# Patient Record
Sex: FEMALE | Race: WHITE
[De-identification: names, ages, dates, MRNs, and addresses within clinical notes are randomized per-mention and may not be internally consistent; named-entity substitution may affect disease eponyms.]

---

## 2018-05-27 ENCOUNTER — HOSPITAL ENCOUNTER (EMERGENCY)
Dept: HOSPITAL 84 - D.ER | Age: 58
Discharge: HOME | End: 2018-05-27
Payer: COMMERCIAL
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
## 2018-05-27 DIAGNOSIS — S79.912A: Primary | ICD-10-CM

## 2018-06-28 ENCOUNTER — HOSPITAL ENCOUNTER (OUTPATIENT)
Dept: HOSPITAL 84 - D.MRI | Age: 58
Discharge: HOME | End: 2018-06-28
Attending: NURSE PRACTITIONER
Payer: COMMERCIAL

## 2018-06-28 DIAGNOSIS — M25.552: Primary | ICD-10-CM

## 2019-04-19 ENCOUNTER — HOSPITAL ENCOUNTER (OUTPATIENT)
Dept: HOSPITAL 84 - D.MAMMO | Age: 59
Discharge: HOME | End: 2019-04-19
Attending: FAMILY MEDICINE
Payer: COMMERCIAL

## 2019-04-19 DIAGNOSIS — Z12.31: Primary | ICD-10-CM

## 2020-08-14 ENCOUNTER — HOSPITAL ENCOUNTER (OUTPATIENT)
Dept: HOSPITAL 84 - D.HCCARDIO | Age: 60
Discharge: HOME | End: 2020-08-14
Attending: INTERNAL MEDICINE
Payer: MEDICAID

## 2020-08-14 DIAGNOSIS — I20.9: ICD-10-CM

## 2020-08-14 DIAGNOSIS — I10: Primary | ICD-10-CM

## 2020-09-17 ENCOUNTER — HOSPITAL ENCOUNTER (OUTPATIENT)
Dept: HOSPITAL 84 - D.CATH | Age: 60
Discharge: HOME | End: 2020-09-17
Attending: INTERNAL MEDICINE
Payer: MEDICAID

## 2020-09-17 VITALS — BODY MASS INDEX: 40.13 KG/M2 | HEIGHT: 66 IN | WEIGHT: 249.72 LBS

## 2020-09-17 VITALS — DIASTOLIC BLOOD PRESSURE: 60 MMHG | SYSTOLIC BLOOD PRESSURE: 125 MMHG

## 2020-09-17 DIAGNOSIS — I25.10: Primary | ICD-10-CM

## 2020-09-17 DIAGNOSIS — R94.39: ICD-10-CM

## 2020-09-17 DIAGNOSIS — R53.83: ICD-10-CM

## 2020-09-17 DIAGNOSIS — I48.91: ICD-10-CM

## 2020-09-17 DIAGNOSIS — E78.5: ICD-10-CM

## 2020-09-17 DIAGNOSIS — I10: ICD-10-CM

## 2020-09-17 DIAGNOSIS — R06.00: ICD-10-CM

## 2020-09-17 LAB
ALT SERPL-CCNC: 24 U/L (ref 10–68)
ANION GAP SERPL CALC-SCNC: 13 MMOL/L (ref 8–16)
BASOPHILS NFR BLD AUTO: 1 % (ref 0–2)
BUN SERPL-MCNC: 21 MG/DL (ref 7–18)
CALCIUM SERPL-MCNC: 9.5 MG/DL (ref 8.5–10.1)
CHLORIDE SERPL-SCNC: 105 MMOL/L (ref 98–107)
CHOLEST/HDLC SERPL: 4.7 RATIO (ref 2.3–4.1)
CO2 SERPL-SCNC: 24.7 MMOL/L (ref 21–32)
CREAT SERPL-MCNC: 0.5 MG/DL (ref 0.6–1.3)
EOSINOPHIL NFR BLD: 4.1 % (ref 0–7)
ERYTHROCYTE [DISTWIDTH] IN BLOOD BY AUTOMATED COUNT: 13.4 % (ref 11.5–14.5)
GLUCOSE SERPL-MCNC: 138 MG/DL (ref 74–106)
HCT VFR BLD CALC: 44 % (ref 36–48)
HDLC SERPL-MCNC: 42 MG/DL (ref 32–96)
HGB BLD-MCNC: 15.3 G/DL (ref 12–16)
IMM GRANULOCYTES NFR BLD: 0 % (ref 0–5)
LDL-HDL RATIO: 3.2 RATIO (ref 1.5–3.5)
LDLC SERPL-MCNC: 136 MG/DL (ref 0–100)
LYMPHOCYTES NFR BLD AUTO: 27.6 % (ref 15–50)
MCH RBC QN AUTO: 29.9 PG (ref 26–34)
MCHC RBC AUTO-ENTMCNC: 34.8 G/DL (ref 31–37)
MCV RBC: 86.1 FL (ref 80–100)
MONOCYTES NFR BLD: 10.5 % (ref 2–11)
NEUTROPHILS NFR BLD AUTO: 56.8 % (ref 40–80)
OSMOLALITY SERPL CALC.SUM OF ELEC: 282 MOSM/KG (ref 275–300)
PLATELET # BLD: 284 10X3/UL (ref 130–400)
PMV BLD AUTO: 10 FL (ref 7.4–10.4)
POTASSIUM SERPL-SCNC: 3.7 MMOL/L (ref 3.5–5.1)
RBC # BLD AUTO: 5.11 10X6/UL (ref 4–5.4)
SODIUM SERPL-SCNC: 139 MMOL/L (ref 136–145)
TRIGL SERPL-MCNC: 106 MG/DL (ref 30–200)
WBC # BLD AUTO: 6.2 10X3/UL (ref 4.8–10.8)

## 2020-09-17 PROCEDURE — 93458 L HRT ARTERY/VENTRICLE ANGIO: CPT

## 2020-09-17 NOTE — HEMODYNAMI
PATIENT:JOSE GUADALUPE WAGONER                                  MEDICAL RECORD: [id number]
: 58                                            LOCATION:[facility]          
ACCT# [id number]                                       ADMISSION DATE: 20
 
 
 Generatedon:202015:01
Patient name: JOSE GUADALUPE WAGONER Patient #: [id number] Visit #: [id number] SSN: 5
[id number] :
1960 Date of study: 2020
Page: Of
Hemodynamic Procedure Report
****************************
Patient Data
Patient Demographics
Procedure consent was obtained
First Name: JOSE GUADALUPE           Gender: Female
Last Name: CIARAN        : 1960
Patient #: [id number]       Age: 60 year(s)
Visit #: [id number]       Race: 
SSN: [id number]
Accession #:
[id number]
Additional ID: 
Contact details
Address: [place] [address]        Phone: [phone]
State: [place]
City: [place]
Zip code: [address]
Past Medical History
Performed procedures and imaging results
Date      Procedure       Procedure Results      Comments
2020 Stress testing  Positive->Intermediate
with SPECT MPI  risk
Allergies: No known allergies
Admission
Admission Data
Admission Date: 2020   Admission Time: 11:38
Arrival Date: 2020     Arrival Time: 0:00
Admit Source: Other         Insurance Payor: Private
health insurance
[facility] #: [id number]
Height (in.): 66            BSA: 2.2 (m2)
Height (cm.): 167.64        BMI: 40.31 (kg/m2)
Weight (lbs.): 249.72
Weight (kg.): 113.27
Lab Results
Lab Result Date: 2020  Lab Result Time: 0:00
Biochemistry
Name         Units    Result                Min      Max
BUN          mg/dl    21       --(----)-*   7        18
Creatinine   mg/dl    0.5      -*(----)--   0.6      1.3
eGFR         ml/min   90       --(*---)--   90       120
NONAFRICAN
CBC
Name         Units    Result                Min      Max
Hematocrit   %        44       --(*---)--   42       54
Hemoglobin   g/dl     15.3     --(-*--)--   13.5     17.5
Procedure
Procedure Types
 
Cath Procedure
Diagnostic Procedure
[facility] w/Coronaries
Sedation Charges
Moderate Sedation up to 30 minutes
PCI Procedure
Coronary Stent
Coronary Stent Initial
Hemochron ACT Test
Procedure Description
Procedure Date
Procedure Date: 2020
Procedure Start Time: 14:35
Procedure End Time: 15:00
Procedure Staff
Name                            Function
Bay Bryant MD              Performing Physician
Lanette Chowdhury RT                Monitor
Yola Mckinney RT                    Scrub
Yamileth Sutton RN                  Nurse
Procedure Data
Cath Procedure
Fluoroscopy
Diagnostic fluoroscopy      Total fluoroscopy Time: 2.9
time: 2.9 min               min
Diagnostic fluoroscopy      Total fluoroscopy dose: 924
dose: 924 mGy               mGy
Contrast Material
Contrast Material Type                       Amount (ml)
Isovue 370                                   116
Entry Location
Entry     Primary  Successful  Side  Size  Upsize Upsize Entry    Closure Succes
sful  Closure
Location                             (Fr)  1 (Fr) 2 (Fr) Remarks  Device        
      Remarks
Femoral                        Right 5 Fr  6 Fr                   Exoseal
artery                                     Short
Estimated blood loss: 10 ml
Diagnostic catheters
Device Type               Used For           End Catheter
Placement
MULTIPACK JL 4.0 5Fr      Procedure
catheter
MULTIPACK 3DRC 5Fr        Procedure
catheter
MULTIPACK Pigtail 5 Fr    Procedure
catheter
Procedure Complications
No complications
Procedure Medications
Medication           Administration Route Dosage
Oxygen               etCO2 Nasal cannula  2 l/min
Lidocaine 2%         added to field       20
Heparin Flush Bag    added to field       2 bags
(1000units/500ml NS)
0.9% NaCl            I.V.                 100 ml/hr
Versed               I.V.                 2 mg
Fentanyl             I.V.                 50 mcg
Versed               I.V.                 2 mg
 
Fentanyl             I.V.                 50 mcg
Heparin Bolus        I.V.                 5000 units
Integrilin (Bolus    I.V.                 10.2 ml
2mg/ml)
Plavix               P.O.                 600 mg
Hemodynamics
Rest
BSA: 2.2 (m2) HGB: 15.3 (g/dl) O2 Consumption: Estimated: 202.09 (ml/min) O2 Con
sumption indexed:
Estimated:91.86 (ml/min/m) Heart Rate: 63 (bpm)
Pressure Samples
Time     Site     Value (mmHg) Purpose      Heart      Use
Rate(bpm)
14:45    LV       115/16,22    Snapshot     68
14:45    AO       94/48(73)    Pullback     68
14:45    LV       114/7,19     Pullback     68
Gradients
Valve  Time  Site 1   Site 2    Mean    SEP/DFP    Peak To Heart  Use
(mmHg)  (sec/min)  Peak    Rate
(mmHg)  (bpm)
Aortic 14:45 LV       AO        20      24         20      68
114/7,19 94/48(73)
Calculations
Valve    P-P      Mean      Valve     Index  Valve    Source
Name              Gradient  Area             Flow
(cm2)
Aortic   20       20
20       20
Snapshots
Pre Cath      Intra         NCS           Post Cath
Vital Signs
Time     Heart  Resp   SPO2 etCO2   NIBP (mmHg) Rhythm  Pain    Sedation
Rate   (ipm)  (%)  (mmHg)                      Status  Level
(bpm)
14:10:46 61     11     96   0       120/65(82)  NSR     0 (11)  10(A)
, No
pain
14:15:15 62     14     94   38.9    115/64(91)  NSR     0 (11)  10(A)
, No
pain
14:19:39 63     13     94   42.6    120/67(97)  NSR     0 (11)  10(A)
, No
pain
14:24:07 65     13     96   41.9    120/65(95)  NSR     0 (11)  10(A)
, No
pain
14:28:36 64     13     96   24.7    113/64(88)  NSR     0 (11)  10(A)
, No
pain
14:33:02 58     12     96   29.2    106/63(88)  NSR     0 (11)  10(A)
, No
pain
14:37:24 59     12     96   30.7    111/62(92)  NSR     0 (11)  10(A)
, No
pain
14:41:46 63     12     96   17.2    126/72(98)  NSR     0 (11)  10(A)
, No
pain
14:46:17 65     15     98   17.2    123/70(83)  NSR     0 (11)  10(A)
, No
 
pain
14:50:47 67     13     97   31.4    113/64(91)  NSR     0 (11)  9(A)
, No
pain
14:55:46 65     15     98   39.6    Measuring   NSR     0 (11)  9(A)
, No
pain
14:56:37 67     14     98   28.4    121/73(105) NSR     0 (11)  10(A)
, No
pain
15:01:03 65     8      99   38.9    135/71(94)  NSR     0 (11)  10(A)
, No
pain
Medications
Time     Medication       Route   Dose  Verified Delivered Reason          Notes
    Effectiveness
by       by
14:14:02 Oxygen           etCO2   2     Bay Carson    used for
Nasal   l/min [facility] Sutton RN   procedure
cannula       MD
14:26:49 Lidocaine 2%     added   20ml  Bay Hermosillo   for local
to      vial  [facility]   anesthetic
field         MD       MD
14:28:56 Heparin Flush    added   2     Bay Hermosillo   used for
Bag              to      bags  [facility]   procedure
(1000units/500ml field         MD       MD
NS)
14:29:05 0.9% NaCl        I.V.    100   Bay Crason    Per physician
ml/hr [facility] Sutton RN, MD
14:29:11 Versed           I.V.    2 mg  Bay Carson    for sedation
[facility] RN, MD
14:29:17 Fentanyl         I.V.    50    Bay Carson    for sedation
mcg   [facility] Sutton RN, MD
14:47:05 Versed           I.V.    2 mg  Bay Carson    for sedation
[facility] Sutton RN, MD
14:47:08 Fentanyl         I.V.    50    Bay Carson    for sedation
mcg   [facility] Sutton RN, MD
14:47:18 Heparin Bolus    I.V.    5000  Bay Carson    for             verif
ied
units [facility] Paul Sutton RN   anticoagulation with dr MD roldan
14:49:57 Integrilin       I.V.    10.2  Bay Carson    for             waste
d
(Bolus 2mg/ml)           ml    [facility] Sutton RN   antiplatelet    9.8 ml
MD                 therapy         of vial
14:59:51 Plavix           P.O.    600   Bay Carson    for
mg    Kai  Bell RN   antiplatelet
MD                 therapy
Procedure Log
Time     Note
13:45:19 Diagnostic Cath Status : Elective
13:46:10 Procedure Status Elective Heart Cath (OP).
13:46:13 Yamileth Sutton RN sent for patient. Start room use.
13:46:13 Time tracking: Regular hours (M-F 7:00 - 5:00)
13:46:17 Plan of Care:Hemodynamics will remain stable., Cardiac
 
rhythm will remain stable., Comfort level will be
maintained., Respiratory function will remain
adequate., Patient/ family verbilizes understanding of
procedure., Procedure tolerated without complication.,
Recovers from procedure without complications..
13:59:48 Admit Source: Other
13:59:56 Patient received from Pre/Post Procedure Room to CCL 1
Alert and oriented. Tansferred to table in Supine
position.
13:59:59 Signed procedure consent form obtained from patient.
13:59:59 Warm blankets applied, and abraham hugger turned on for
patient comfort.
14:00:00 Correct patient and procedure confirmed by team.
14:00:01 ECG and BP/O2 sat monitors applied to patient.
14:00:11 H&P Date Dictated: 2020 Within 30 days and on
chart..
14:00:12 Pre-procedure instructions explained to patient.
14:00:13 Pre-op teaching completed and patient verbalized
understanding.
14:00:17 Family unavailable.
14:00:19 Patient NPO since Midnight.
14:00:58 Lab results completed and on chart.
14:: Lab Result : BUN 21 mg/dl
14:: Lab Result : Creatinine 0.5 mg/dl
14:: Lab Result : Hemoglobin 15.3 g/dl
14:: Lab Result : eGFR NONAFRICAN 90 ml/min
14:: Lab Result : Hematocrit 44 %
14:09:26 Vital chart was started
14:09:27 Full Disclosure recording started
14:09:33 Patient allergic to No known allergies
14:09:38 Is the patient allergic to Iodine/contrast media? No.
14:09:40 Was the patient premedicated? N/A
14:09:43 Is patient on blood thinner?Yes
14:09:46 **ACC** The patient was administered the following
blood thiners within the last 24 hours: Eliquis
14:09:50 Patient diabetic? No.
14:10:03 ----Pre-sedation anethsthesia assessment.----
14:10:06 Previous problem with sedation/anesthesia? No ?
14:10:07 Snore? Yes
14:10:10 Sleep apnea? Unknown
14:10:15 Deviated septum? No
14:10:16 Opens mouth fully? Yes
14:10:17 Sticks out tongue? Yes
14:10:19 Airway obstruction? No ?
14:10:21 Dentures? No ?
14:10:24 Pre procedure: right dorsailis pedis pulse 2+ Normal;
easily identifiable; not easily obliterated
14:10:28 Modified Kush's test Ulnar < 7 seconds
14:10:31 Patient pain scale 0/10 ?.
14:10:35 IV patent on arrival in left antecubital with 0.9%
NaCl at O.
14:10:41 Stress Test: yes; abnormal ANTERIOR
14:10:45 Right Radial & Right Groin area was prepped with
chlora-prep and draped in sterile fashion
14:10:46 Alarms reviewed by OTTONIEL NEWMAN.
14:10:47 Sharps counted by scrub and verified by JERICA.
14:11:37 Arrival Date: 2020 12:00:00 AM
14:11:46 Patient Height : 66 inches
14:11:50 Patient Weight : 249.72 lbs
14:12:02 Insurance Payor : Private health insurance
 
14:14:02 Oxygen 2 l/min etCO2 Nasal cannula was administered by
Yamileth Sutton RN; used for procedure; Verbal order read
back and verified.
14:14:40 Baseline sample Acquired.
14:14:52 Rhythm: atrial fibrillation
14:15:39 1) 90+ Normal kidney functon but urine findings or
structural abnormalities or genetic trait point to
kidney disease.
14:15:41 Maximum allowable contrast dose (3.7 X eGFR X 0.75)250
ml.
14:19:28 Fire Safety Assessment: A--An alcohol-based skin
anteseptic being used preoperatively., C--Open oxygen
or nitrous oxide is being used., D--An ESU, laser, or
fiber-optic light is being used.
14:19:31 Physical assessment completed. ASA score P 2 - A
patient with mild systemic disease as per Bay Bryant MD.
14:26:49 Lidocaine 2% 20ml vial added to field was administered
by Bay Bryant MD; for local anesthetic; Verbal
order read back and verified.
14:28:49 --------ALL STOP TIME OUT------
14:28:55 Right Radial & Right Groin site verified by team.
14:28:56 Heparin Flush Bag (1000units/500ml NS) 2 bags added to
field was administered by Bay Bryant MD; used for
procedure; Verbal order read back and verified.
14:29:01 Final Timeout: patient, procedure, and site verified
with staff and physician. All members of the team are
in agreement.
14:29:02 Sedation plan: IV Moderate Sedation Medication:Versed,
Fentanyl
14:29:05 0.9% NaCl 100 ml/hr I.V. was administered by Yamileth Sutton RN; Per physician; Verbal order read back and
verified.
14:29:11 Versed 2 mg I.V. was administered by Yamileth Sutton RN;
for sedation; Verbal order read back and verified.
14:29:17 Fentanyl 50 mcg I.V. was administered by Yamileth Sutton
RN; for sedation; Verbal order read back and verified.
14:30:05 Use device set Radial Dx or PCI
14:30:07 ACIST Syringe (81293) opened to sterile field.
14:30:07 Medline Cath Pack ([id number]) opened to sterile field.
14:30:09 Bag Decanter (2002S) opened to sterile field.
14:30:09 ACIST Hand Control (31850) opened to sterile field.
14:30:10 ACIST Manifold (86839) opened to sterile field.
14:30:13 MBrace Wrist Support ([id number]) opened to sterile
field.
14:30:14 EMERALD Guide Wire (502-265) opened to sterile field.
14:30:15 SHEATH 6FR RAIN (1358609) opened to sterile field.
14:34:21 Procedure started.
14:35:04 Local anesthetic to right radial artery with Lidocaine
2% by Bay Bryant MD.**INITIAL ACCESS ONLY**
14:39:13 UNABLE TO GAIN ACCESS RADIAL PROCEEDING TO GROIN.
14:39:20 Use device set Femoral Dx
14:39:29 DIAGNOSTIC Multipack 5Fr catheter set (VL8735) opened
to sterile field.
14:39:34 SHEATH 5FR [place] (UYW695) opened to sterile field.
14:39:45 Local anesthetic to right femoral artery with
Lidocaine 2% by Bay Bryant MD.**ADDITIONAL
ACCESS**
14:40:46 A 5 Fr sheath was inserted into the Right Femoral
artery
 
14:41:10 A MULTIPACK JL 4.0 5Fr catheter was advanced over the
wire and used for Procedure.
14:41:15 LCA angiography performed.
14:41:18 Injector settings: Ml/sec: 3, Volume: 6,
14:42:28 Catheter removed.
14:42:57 A MULTIPACK 3DRC 5Fr catheter was advanced over the
wire and used for Procedure.
14:43:01 RCA angiography performed.
14:43:04 Injector settings: Ml/sec: 3, Volume: 6,
14:43:37 **ACC**Dominant side:Co-Dominant
14:43:44 Catheter removed.
14:43:48 Use device set KAI PCI
14:43:58 A MULTIPACK Pigtail 5 Fr catheter was advanced over
the wire and used for Procedure.
14:44:06 LV gram done using BETANCUR
14:44:10 Injector settings: Ml/sec: 5, Volume: 15,
14:45:17 LV hemodynamics recorded.
14:45:22 EF : 55 %
14:45:28 Catheter removed.
14:45:54 SHEATH 6FR [place] (ZOW383) opened to sterile field.
14:45:57 INFLATOR Merit BasixCompak (SW4746) opened to sterile
field.
14:46:19 BMW 300cm Universal 2 J wire ([id number]) opened to
sterile field.
14:46:34 Proceeding to intervention.
14:46:51 Sheath upsized to a 6 Fr Short.
14:47:05 Versed 2 mg I.V. was administered by Yamileth Sutton RN;
for sedation; Verbal order read back and verified.
14:47:08 Fentanyl 50 mcg I.V. was administered by Yamileth Sutton
RN; for sedation; Verbal order read back and verified.
14:47:18 Heparin Bolus 5000 units I.V. was administered by
Yamileth Sutton RN; for anticoagulation; verified with dr roldan Verbal order read back and verified.
14:48:07 GUIDE 6FR XBLAD 3.5 catheter ([id number]) opened to
sterile field.
14:48:14 6 Fr XBLAD 3.5 guide catheter was inserted over the
wire
14:48:27 Pre PCI Site: Native OM1 has 80% stenosis.
14:48:34  wire advanced.
14:48:40 Wire advanced across lesion.
14:49:57 Integrilin (Bolus 2mg/ml) 10.2 ml I.V. was
administered by Yamileth Sutton RN; for antiplatelet
therapy; wasted 9.8 ml of vial Verbal order read back
and verified.
14:51:47 Place stent Inflation Number: 1 A AMERICA RX 2.5 x 12
stent ([id number]) was prepped and advanced across
the 1st Ob Chasity 80. The stent was deployed at 14 DUNIA
for 0:00 (min:sec) .
14:52:46 Inflation number: 2 The stent balloon was then
re-inflated across the 1st Ob Chasity to 8 DUNIA for 0:00
(min:sec) .
14:53:00 EXOSEAL 6Fr () opened to sterile field.
14:56:01 Stent catheter was removed intact over wire.
14:56:02 Wire removed.
14:56:02 Guide catheter removed.
14:56:14 Sheath removed intact; hemostasis achieved with
Exoseal to the Right Femoral artery.
14:56:17 Procedure ended.(Physican Out)
14:56:48 Fluoroscopy time 02.90 minutes.
14:56:52 Fluoroscopy dose: 924 mGy
 
14:56:52 Flurop Dose total: 924
14:56:59 Dose Area Product 10584 mGy/cm.
14:57:04 Contrast amount:Isovue 370 116ml.
14:57:06 Maximum allowable dose exceeded? Yes.
14:57:07 Sharps counted by scrub and verified by R.N.
14:57:12 Post-op/insertion site Right Femoral artery dressed
using a 4 x 4 and Tegaderm.
14:57:19 Post right femoral artery:stable, soft, clean and dry
14:57:29 Post Procedure Pulses reassessed and unchanged
14:57:32 Post procedure: right dorsailis pedis pulse 2+ Normal;
easily identifiable; not easily obliterated.
14:57:34 Post-procedure physical assessment completed. ASA
score P 2 - A patient with mild systemic disease as
per Bay Bryant MD.
14:57:38 Post procedure rhythm: unchanged.
14:57:41 Estimated blood loss: 10 ml
14:57:42 Post procedure instruction explained to
patient.Patient verbalizes understanding.
14:57:43 Patient needs reinforcement of post procedure
teaching.
14:58:11 Procedure type changed to Cath procedure, Diagnostic
procedure, C, [facility] w/Coronaries, Sedation Charges,
Moderate Sedation up to 30 minutes, PCI procedure,
Coronary Stent, Coronary Stent Initial, Hemochron ACT
Test
14:58:58 Procedure and supply charges have been captured,
reviewed, submitted and are correct.
14:59:03 Procedure Complication : No complications
14:59:08 [facility] Findings: MVD- PCI performed (see procedure note)
14:59:11 Operative report dictated upon procedure completion.
14:59:13 See physician's report for complete and final results.
14:59:16 Report given to Pre/Post Procedure Room.
14:59:21 Patient transfered to Pre/Post Procedure Room with
Stretcher.
14:59:29 **ACC-PCI Only** Patient was given prescriptions, or
instructed by Bay Bryant MD to start/continue the
following medications upon discharge: Plavix
14:59:51 Plavix 600 mg P.O. was administered by Yamileth Sutton RN;
for antiplatelet therapy; Verbal order read back and
verified.
15:00:01 ACT drawn and resulted at 217 seconds. (normal
therapeutic range 180-240 seconds).
15:00:36 Vital chart was stopped
15:00:40 Procedure ended.
15:00:40 Full Disclosure recording stopped
15:00:48 End room use (Document Last)
15:00:59 End room use (Document Last)
Intervention Summary
Intervention Notes
Time     ActionType  Lesion and  Equipment Used Action#  Pressure  Duration
Attributes
14:51:47 Place stent 1st Ob Chasity AMERICA RX 2.5 x  1        14        00:00
12 stent
([id number])
14:52:46 Reinflate   1st Ob Chasity AMERICA RX 2.5 x  2        8         00:00
stent                   12 stent
balloon                 ([id number])
Device Usage
Item Name      Manufacture  Quantity  Catalog       [facility] Part     Current Mi
nimal Lot# /
 
Number        Charge   Number   Stock   Stock   Serial#
Code
ACIST Syringe  Acist        1         04132         448675   550920   439855  20
(90326)        Medical
Systems Inc
Medline Cath   Medline      1         [id number]     635579   03747    370908  5
Pack
([id number])
Bag Decanter   Microtek     1         S         904031   58772    554265  5
(S)        Medical Inc.
ACIST Hand     Acist        1         82782         076208   077257   885879  5
Control        Medical
(28984)        Systems Inc
ACIST Manifold Acist        1         54080         120050   413639   964421  5
(50352)        Medical
Systems Inc
MBrace Wrist   Advanced     1         140-0250-00   088029   08749    102170  5
Support        Vascular
([id number])    Dynamics
EMERALD Guide  Cardinal     1         502-455       532894   847311   078529  5
Wire (502-455) Health
SHEATH 6FR     Cardinal     1         8224548       218968   2800123  395501  5
RAIN (3028278) Health
DIAGNOSTIC     Cardinal     1         LC0127        480094   96947    136625  30
Multipack 5Fr  Health
catheter set
(UH6225)
SHEATH 5FR     Terumo       1         MPB751        222960   564979   293837  5
[place]
(ULF265)
MULTIPACK JL   Cardinal     1                                         103589  5
4.0 5Fr        Health
catheter
MULTIPACK 3DRC Cardinal     1                                         855731  5
5Fr catheter   Health
MULTIPACK      Cardinal     1                                         090247  5
Pigtail 5 Fr   Health
catheter
SHEATH 6FR     Terumo       1         [id number]        363049   613280   881810  40
[place]
(ZDJ410)
INFLATOR Merit Merit        1         IY0924        420125   726485   856379  15
[facility]    Medical
(PZ1823)
BMW 300cm      Hollis       1         2072243Q      440428   960232   659806  5
[place]iss 2 J  Vascular
wire
(7068774A)
GUIDE 6FR      Cardinal     1         11975566      331310   983458   243531  10
XBLAD 3.5      Health
catheter
(89600091)
AMERICA RX 2.5 x  Medtronic    1         [id number]  873696   2348881  146590  5 
      [id number]
12 stent
([id number])
EXOSEAL 6Fr    Cardinal     1                  609071   289812   554410  10
()        Health
Signature Audit [place]
Stage           Time        Signature      Unsigned
 
Intra-Procedure 2020   Lanette Chowdhury
3:00:59 PM  RT(R)
Intra-Procedure 2020   Yamileth Sutton RN
3:01:14 PM
Intra-Procedure 2020   Bay Sawant
3:01:35 PM  Paul WHITT
 
 
 
 
 
 
 
 
 
 
 
 
 
 
 
 
 
 
 
 
 
 
 
 
 
 
 
 
 
 
 
 
 
 
 
 
 
 
 
 
 
 
 
 
 
 
 
 
 
[facility]                                          
[address] [place] [address]

## 2020-09-17 NOTE — NUR
PT RECEIVED TO ROOM VIA STRETCHER FOR RECOVERY.  PT AWAKE BUT DROWSY,
DENIES PAIN OR DISCOMFORT AT THIS TIME.  IV HEPLOCKED.  PT PLACED ON
CARDIAC MONITORS AND O2 VIA NC AT 2L.  HR NSR DUKE 60, /80, RR
10, SAT 99.  R GROIN SOFT, DRESSING CDI NO S/S HEMATOMA OR BLEEDING.
LEG PINK AND WARM, PEDAL PULSES PALPALBE. PT INSTRUCTED TO KEEP HEAD
ON PILLOW AND LEG STRAIGHT, SHE VERBALIZED UNDERSTANDING.  CALL LIGHT
IN REACH,  AT BS

## 2020-09-17 NOTE — NUR
PT RESTING COMFORTABLY, DENIES PAIN OR NEEDS.  R GROIN SOFT, DRESSING
REMAINS CDI NO S/S HEMATOMA NOTED.  CALL LIGHT IN REACH,  AT
BEDSIDE.

## 2020-09-17 NOTE — OP
PATIENT NAME:  JOSE GUADALUPE WAGONER                           MEDICAL RECORD: [id number]
:58                                             LOCATION:D[facility]          
                                                         ADMISSION DATE:        
SURGEON:  JACKIE QUINN MD          
 
 
DATE OF OPERATION:  2020
 
PROCEDURE:  Left heart catheterization, selective coronary angiography, right
femoral artery approach.
 
CATHETERS:  A 5-Turkish sheath, 5/4 left and right Gabriel, 5/4 pig.
 
The procedure was well tolerated. We proceeded immediately to PTCA stenting of
the right.  The procedure was finished.
 
FINDINGS:  Left ventriculography in 30-degree BETANCUR view:  Normal wall motion,
normal systolic function.
 
CORONARY ANATOMY:
LEFT MAIN:  Free of disease.
LAD:  Free of disease in the diagonal system.
CIRCUMFLEX:  Has a moderate size OM with an 80% stenosis in its proximal
portion.
RIGHT CORONARY ARTERY:  Dominant, free of disease.
 
IMPRESSION:  Plan intervention to OM momentarily.
 
DESCRIPTION OF PROCEDURE:  A 5-Turkish sheath was exchanged for a 6-Turkish
sheath.  XB LAD guide catheter provided excellent guide catheter support
followed by 300 cm Whisper wire, which was placed across tightly occluded OM
down this portion of vessel.  Stent deployed was a 2.5 x 12 mm Luigi drug-eluting
stent up to 14 atmospheres.  Final angiography shows excellent resolution of 80%
stenosis.  No significant residual.  RICHARD flow was 3 throughout the procedure. 
Heparin was used during the case.  Sheath was closed with ExoSeal device. 
Plavix was loaded in the lab.
 
NTS:[id number] Voice Confirmation ID: [id number] DOCUMENT ID: [id number]
                                           
                                           JACKIE QUINN MD          
 
 
 
 
 
 
 
 
 
 
CC:                                                             [id number]
DICTATION DATE: 20 1501     :     203      [facility] 
                                                                      20
[place] [id number]

## 2020-09-17 NOTE — NUR
PT TO BR VIA WC, VOIDING W/O DIFFICULITY.  PT THEN DISCHARGED VIA WC
TO  WAITING IN PRIVATE VEHICLE. PT HAD ALL BELONGINGS AND
DISCHARGE PAPERWORK

## 2020-09-17 NOTE — NUR
DISCHARGE INSTRUCTIONS REVIEWED W PT AND , DISCUSSED
IMPORTANCE OF GETTING PLAVIX FILLED AND STARTING THAT TOMORROW.  THEY
VERBALIZED UNDERSTANDING.  IV REMOVED, MONITORS REMOVED.  PT UP TO
DRESS FOR DISCHARGE

## 2020-09-17 NOTE — NUR
PT SLEEPING, R GROIN SOFT, DRESSING CDI NO S/S HEMATOMA OR BLEEDING
NOTED.  VSS AT PRESENT.  CALL LIGHT IN REACH,  AT BEDSIDE.  PT
TOLERATING PO FLUIDS

## 2020-09-17 NOTE — NUR
PT DOING WELL, DENIES PAIN OR NEEDS.  R GROIN SOFT, NO S/S HEMATOMA
NOTED.  CALL LIGHT IN REACH, VSS.  O2 REMOVED, SAT 98 ON ROOM AIR.

## 2020-09-17 NOTE — NUR
R GROIN SOFT, NO S/S HEMATOMA.  HOB ELEVATED SLIGHTLY.  VSS.  PT
DENIES PAIN OR NEEDS AT THIS TIME.  TOLERATING PO FLUIDS.   AT
BS